# Patient Record
Sex: FEMALE | Race: WHITE | NOT HISPANIC OR LATINO | Employment: FULL TIME | URBAN - METROPOLITAN AREA
[De-identification: names, ages, dates, MRNs, and addresses within clinical notes are randomized per-mention and may not be internally consistent; named-entity substitution may affect disease eponyms.]

---

## 2023-05-09 ENCOUNTER — OFFICE VISIT (OUTPATIENT)
Dept: GASTROENTEROLOGY | Facility: CLINIC | Age: 33
End: 2023-05-09
Payer: COMMERCIAL

## 2023-05-09 VITALS
SYSTOLIC BLOOD PRESSURE: 102 MMHG | WEIGHT: 130.75 LBS | BODY MASS INDEX: 22.32 KG/M2 | HEIGHT: 64 IN | HEART RATE: 78 BPM | OXYGEN SATURATION: 98 % | DIASTOLIC BLOOD PRESSURE: 62 MMHG

## 2023-05-09 DIAGNOSIS — K64.9 HEMORRHOIDS, UNSPECIFIED HEMORRHOID TYPE: ICD-10-CM

## 2023-05-09 DIAGNOSIS — K62.5 RECTAL BLEEDING: ICD-10-CM

## 2023-05-09 DIAGNOSIS — K59.00 CONSTIPATION, UNSPECIFIED CONSTIPATION TYPE: Primary | ICD-10-CM

## 2023-05-09 PROCEDURE — 99203 OFFICE O/P NEW LOW 30 MIN: CPT | Mod: S$GLB,,, | Performed by: PHYSICIAN ASSISTANT

## 2023-05-09 PROCEDURE — 3074F SYST BP LT 130 MM HG: CPT | Mod: CPTII,S$GLB,, | Performed by: PHYSICIAN ASSISTANT

## 2023-05-09 PROCEDURE — 3078F DIAST BP <80 MM HG: CPT | Mod: CPTII,S$GLB,, | Performed by: PHYSICIAN ASSISTANT

## 2023-05-09 PROCEDURE — 3074F PR MOST RECENT SYSTOLIC BLOOD PRESSURE < 130 MM HG: ICD-10-PCS | Mod: CPTII,S$GLB,, | Performed by: PHYSICIAN ASSISTANT

## 2023-05-09 PROCEDURE — 99203 PR OFFICE/OUTPT VISIT, NEW, LEVL III, 30-44 MIN: ICD-10-PCS | Mod: S$GLB,,, | Performed by: PHYSICIAN ASSISTANT

## 2023-05-09 PROCEDURE — 3008F BODY MASS INDEX DOCD: CPT | Mod: CPTII,S$GLB,, | Performed by: PHYSICIAN ASSISTANT

## 2023-05-09 PROCEDURE — 99999 PR PBB SHADOW E&M-EST. PATIENT-LVL IV: ICD-10-PCS | Mod: PBBFAC,,, | Performed by: PHYSICIAN ASSISTANT

## 2023-05-09 PROCEDURE — 1159F PR MEDICATION LIST DOCUMENTED IN MEDICAL RECORD: ICD-10-PCS | Mod: CPTII,S$GLB,, | Performed by: PHYSICIAN ASSISTANT

## 2023-05-09 PROCEDURE — 3078F PR MOST RECENT DIASTOLIC BLOOD PRESSURE < 80 MM HG: ICD-10-PCS | Mod: CPTII,S$GLB,, | Performed by: PHYSICIAN ASSISTANT

## 2023-05-09 PROCEDURE — 3008F PR BODY MASS INDEX (BMI) DOCUMENTED: ICD-10-PCS | Mod: CPTII,S$GLB,, | Performed by: PHYSICIAN ASSISTANT

## 2023-05-09 PROCEDURE — 99999 PR PBB SHADOW E&M-EST. PATIENT-LVL IV: CPT | Mod: PBBFAC,,, | Performed by: PHYSICIAN ASSISTANT

## 2023-05-09 PROCEDURE — 1159F MED LIST DOCD IN RCRD: CPT | Mod: CPTII,S$GLB,, | Performed by: PHYSICIAN ASSISTANT

## 2023-05-09 RX ORDER — POLYETHYLENE GLYCOL 3350 17 G/17G
17 POWDER, FOR SOLUTION ORAL DAILY
COMMUNITY

## 2023-05-09 NOTE — PROGRESS NOTES
Clinic Consult:  [facility] Consultation Note    Reason for Consult:  The primary encounter diagnosis was Constipation, unspecified constipation type. Diagnoses of Hemorrhoids, unspecified hemorrhoid type and Rectal bleeding were also pertinent to this visit.    PCP: Primary Doctor Fatimah   [address] / [place] [address]    CC: Constipation (With hemorrhoids)      HPI:  This is a 32 y.o. female here for evaluation of the above. Reports that she began with constipation during her first pregnancy about 5 years ago. Seemed to progress with second pregnancy which was a few years later. Reports that it got to a point that it was fairly severe. Continues with these symptoms. If she doesn't take any medication, may go 1-2 weeks without a bowel movement. She has tried probiotics, Miralax. Miralax does help produce a BM, but so soft that she does not feel as though she is emptying well. Will take dulcolax if she gets to 1 week without BM. Will help produce a single bowel movement - symptoms return. Drinks water and is active. Also reports history of intermittent BRBPR that began during one of her pregnancies. Believes due to possible internal hemorrhoids. Does not occur when stools are moving well from Miralax, but will see BRB when she is straining for a bowel movement. Notices more bowel movements during the week of her cycle.     Review of Systems   Constitutional:  Negative for activity change, appetite change, chills, diaphoresis, fatigue, fever and unexpected weight change.   Respiratory:  Negative for shortness of breath.    Cardiovascular:  Negative for chest pain.   Gastrointestinal:  Positive for abdominal distention (intermittent), abdominal pain, anal bleeding and constipation. Negative for blood in stool, diarrhea, nausea and vomiting. Rectal pain: Notices occasional itching and burning sensation after BM.  Genitourinary:         Notices occasional incontinence with sneezing or  "jumping.   Skin:  Negative for color change and pallor.   Neurological:  Negative for dizziness, weakness and light-headedness.      Medical History:   Past Medical History:   Diagnosis Date    Abnormal Pap smear of cervix     Anemia, unspecified        Surgical History:   Past Surgical History:   Procedure Laterality Date    TONSILLECTOMY         Family History:    Family History   Problem Relation Age of Onset    Stroke Neg Hx     Hypertension Neg Hx     Breast cancer Neg Hx     Diabetes Neg Hx        Social History:   Social History     Socioeconomic History    Marital status:    Tobacco Use    Smoking status: Never    Smokeless tobacco: Never   Substance and Sexual Activity    Alcohol use: Yes    Drug use: Never    Sexual activity: Yes   Social History Narrative    ** Merged History Encounter **            Allergies:   Review of patient's allergies indicates:  No Known Allergies    Home Medications:   Current Outpatient Medications on File Prior to Visit   Medication Sig Dispense Refill    multivitamin capsule Take 1 capsule by mouth once daily.      polyethylene glycol (GLYCOLAX) 17 gram/dose powder Take 17 g by mouth once daily.       No current facility-administered medications on file prior to visit.       Physical Exam:  /62   Pulse 78   Ht 5' 4" (1.626 m)   Wt 59.3 kg (130 lb 11.7 oz)   SpO2 98%   BMI 22.44 kg/m²   Body mass index is 22.44 kg/m².  Physical Exam  Constitutional:       General: She is not in acute distress.     Appearance: Normal appearance. She is not ill-appearing, toxic-appearing or diaphoretic.   HENT:      Head: Normocephalic and atraumatic.   Eyes:      General: No scleral icterus.     Extraocular Movements: Extraocular movements intact.   Cardiovascular:      Rate and Rhythm: Normal rate and regular rhythm.   Pulmonary:      Effort: Pulmonary effort is normal. No respiratory distress.      Breath sounds: Normal breath sounds.   Abdominal:      General: Bowel sounds " are normal. There is no distension.      Palpations: Abdomen is soft. There is no mass.      Tenderness: There is no abdominal tenderness. There is no guarding.   Musculoskeletal:         General: Normal range of motion.      Cervical back: Normal range of motion.   Skin:     General: Skin is warm and dry.      Coloration: Skin is not jaundiced or pale.   Neurological:      Mental Status: She is alert and oriented to person, place, and time.         Labs: Pertinent labs reviewed.  Endoscopy: --  CRC Screening: --  Anticoagulation: --    Assessment:  1. Constipation, unspecified constipation type    2. Hemorrhoids, unspecified hemorrhoid type    3. Rectal bleeding         Recommendations:  -Had discussion with patient regarding constipation. She is drinking water appropriately and is very active. She takes probiotics and gets daily dose of fiber regularly. No current medications that would be causing this. Could have a hormonal component? Explained slow transit issue vs. Pelvic floor weakness. Pelvic floor would be consistent with onset of symptoms during pregnancy and the addition of urinary incontinence. Discuss starting on Linzess 145mcg and referral to pelvic floor therapy. External referral placed.   -discussed rectal suppositories for internal hemorrhoids given intermittent BRBPR with straining or hard bowel movement. Patient declined.   -Explained that if we can't get symptoms improved or controlled, then colonoscopy is a possibility for further evaluation.  -Recommended 4-6 week follow up.     Constipation, unspecified constipation type  -     linaCLOtide (LINZESS) 145 mcg Cap capsule; Take 1 capsule (145 mcg total) by mouth before breakfast.  Dispense: 30 capsule; Refill: 2  -     Ambulatory referral/consult to Physical/Occupational Therapy; Future; Expected date: 05/16/2023    Hemorrhoids, unspecified hemorrhoid type  -     Ambulatory referral/consult to Gastroenterology  -     linaCLOtide (LINZESS) 145 mcg  Cap capsule; Take 1 capsule (145 mcg total) by mouth before breakfast.  Dispense: 30 capsule; Refill: 2    Rectal bleeding  -     Ambulatory referral/consult to Gastroenterology  -     linaCLOtide (LINZESS) 145 mcg Cap capsule; Take 1 capsule (145 mcg total) by mouth before breakfast.  Dispense: 30 capsule; Refill: 2        No follow-ups on file.    Thank you so much for allowing me to participate in the care of Jennie Obrien PA-C